# Patient Record
Sex: MALE | Race: BLACK OR AFRICAN AMERICAN | NOT HISPANIC OR LATINO | Employment: FULL TIME | URBAN - METROPOLITAN AREA
[De-identification: names, ages, dates, MRNs, and addresses within clinical notes are randomized per-mention and may not be internally consistent; named-entity substitution may affect disease eponyms.]

---

## 2022-02-23 ENCOUNTER — HISTORICAL (OUTPATIENT)
Dept: ADMINISTRATIVE | Facility: HOSPITAL | Age: 62
End: 2022-02-23

## 2022-02-23 LAB
ABS NEUT (OLG): 3.2 (ref 2.1–9.2)
BASOPHILS # BLD AUTO: 0 10*3/UL (ref 0–0.2)
BASOPHILS NFR BLD AUTO: 1 %
BUN SERPL-MCNC: 14.7 MG/DL (ref 8.4–25.7)
CALCIUM SERPL-MCNC: 9.4 MG/DL (ref 8.7–10.5)
CHLORIDE SERPL-SCNC: 107 MMOL/L (ref 98–107)
CO2 SERPL-SCNC: 28 MMOL/L (ref 23–31)
CREAT SERPL-MCNC: 0.8 MG/DL (ref 0.73–1.18)
CREAT/UREA NIT SERPL: 18
EOSINOPHIL # BLD AUTO: 0.1 10*3/UL (ref 0–0.9)
EOSINOPHIL NFR BLD AUTO: 2 %
ERYTHROCYTE [DISTWIDTH] IN BLOOD BY AUTOMATED COUNT: 13.3 % (ref 11.5–14.5)
FLAG2 (OHS): 70
FLAG3 (OHS): 90
FLAGS (OHS): 70
GLUCOSE SERPL-MCNC: 83 MG/DL (ref 82–115)
HCT VFR BLD AUTO: 44.6 % (ref 40–51)
HEMOLYSIS INTERF INDEX SERPL-ACNC: 6
HGB BLD-MCNC: 15.2 G/DL (ref 13.5–17.5)
ICTERIC INTERF INDEX SERPL-ACNC: 0
IMM GRANULOCYTES # BLD AUTO: 0.01 10*3/UL
IMM GRANULOCYTES NFR BLD AUTO: 0 %
IMM. NE 2 SUSPECT FLAG (OHS): 10
LIPEMIC INTERF INDEX SERPL-ACNC: 4
LOW EVENT # SUSPECT FLAG (OHS): 70
LYMPHOCYTES # BLD AUTO: 1.8 10*3/UL (ref 0.6–4.6)
LYMPHOCYTES NFR BLD AUTO: 31 %
MANUAL DIFF? (OHS): NO
MCH RBC QN AUTO: 32.1 PG (ref 26–34)
MCHC RBC AUTO-ENTMCNC: 34.1 G/DL (ref 31–37)
MCV RBC AUTO: 94.1 FL (ref 80–100)
MO BLASTS SUSPECT FLAG (OHS): 40
MONOCYTES # BLD AUTO: 0.7 10*3/UL (ref 0.1–1.3)
MONOCYTES NFR BLD AUTO: 12 %
NEUTROPHILS # BLD AUTO: 3.2 10*3/UL (ref 2.1–9.2)
NEUTROPHILS NFR BLD AUTO: 54 %
NRBC BLD AUTO-RTO: 0 % (ref 0–0.2)
PLATELET # BLD AUTO: 228 10*3/UL (ref 130–400)
PLATELET CLUMPS SUSPECT FLAG (OHS): 10
PMV BLD AUTO: 10 FL (ref 7.4–10.4)
POTASSIUM SERPL-SCNC: 3.8 MMOL/L (ref 3.5–5.1)
RBC # BLD AUTO: 4.74 10*6/UL (ref 4.5–5.9)
SARS-COV-2 AG RESP QL IA.RAPID: POSITIVE
SODIUM SERPL-SCNC: 138 MMOL/L (ref 136–145)
WBC # SPEC AUTO: 5.9 10*3/UL (ref 4.5–11)

## 2022-04-26 ENCOUNTER — HISTORICAL (OUTPATIENT)
Dept: ADMINISTRATIVE | Facility: HOSPITAL | Age: 62
End: 2022-04-26

## 2022-06-09 ENCOUNTER — OFFICE VISIT (OUTPATIENT)
Dept: SURGERY | Facility: CLINIC | Age: 62
End: 2022-06-09

## 2022-06-09 ENCOUNTER — ANESTHESIA EVENT (OUTPATIENT)
Dept: SURGERY | Facility: HOSPITAL | Age: 62
End: 2022-06-09

## 2022-06-09 VITALS
DIASTOLIC BLOOD PRESSURE: 75 MMHG | HEART RATE: 77 BPM | RESPIRATION RATE: 18 BRPM | WEIGHT: 180 LBS | TEMPERATURE: 98 F | SYSTOLIC BLOOD PRESSURE: 111 MMHG | BODY MASS INDEX: 25.2 KG/M2 | HEIGHT: 71 IN

## 2022-06-09 DIAGNOSIS — Z01.818 PRE-OP TESTING: ICD-10-CM

## 2022-06-09 DIAGNOSIS — M79.89 SOFT TISSUE MASS: Primary | ICD-10-CM

## 2022-06-09 PROCEDURE — 99214 OFFICE O/P EST MOD 30 MIN: CPT | Mod: PBBFAC

## 2022-06-09 RX ORDER — SODIUM CHLORIDE 9 MG/ML
INJECTION, SOLUTION INTRAVENOUS CONTINUOUS
Status: CANCELLED | OUTPATIENT
Start: 2022-06-09

## 2022-06-09 RX ORDER — HEPARIN SODIUM 5000 [USP'U]/ML
5000 INJECTION, SOLUTION INTRAVENOUS; SUBCUTANEOUS ONCE
Status: CANCELLED | OUTPATIENT
Start: 2022-06-09

## 2022-06-09 NOTE — PROGRESS NOTES
[facility] General Surgery Clinic Note    HPI:   62 yo M with history of right inguinal hernia s/p hernia repair on 3/25/22 presents to clinic with complaints of painful lump to right abdomen. He was last seen in clinic on 4/28/22 for this issue. Has had ultrasound of this area which was unrevealing of any pathology. Since last visit, pt denies any change in shape or size of lesion, erythema, or drainage. Pt reports he first noticed lump a couple months ago, and it has become more noticeable as he has gained weight in the past couple of months since he quit smoking. Has never had trauma to this area. Denies fever, chills, n/v, change in BMs or appetite.    PMH:   No significant PMH    PSH:   Right inguinal hernia repair: 3/25/22    FamHx:   Mother: dementia    SocHx:  Tobacco: former smoker, quit 2 months ago  EtOH: drinks a few beers on weekends  Drug use: none    Allergies:   NKDA    Medications:  None      Objective:  Vitals:    06/09/22 1132   BP: 111/75   Pulse: 77   Resp: 18   Temp: 97.9 °F (36.6 °C)       Physical Exam:  Gen: NAD  Neuro: alert, answering questions appropriately  Resp: non-labored breathing  Abd: soft, non-distended; 3x2cm soft, mobile mass to right abdomen with tenderness to palpation  Ext: moves all extremities symmetrically      A/P:   61 y.o. male with history of right inguinal hernia s/p hernia repair on 3/25/22 presents to clinic with complaints of painful lump to right abdomen. Previous U/S of this area unrevealing of any pathology.     - schedule for excision of lesion (lipoma vs cyst)      Kenya Crook, MS3    ------------------------------  ADDENDUM  Patient seen and examined with medical student. I have edited the above note and agree with the plan as follows:    Patient doing very well post op inguinal hernia repair. Incision well healing. Tolerating diet with no issues. Denies obstructive symptoms. Regular BMs. Patient's only complaint is a right abdominal mass that is mobile and  tender to palpation. Abdominal ultrasound was unremarkable. On exam, mass is most consistent with a lipoma. Risks vs benefits discussed with patient, who wishes to proceed with soft tissue excision. Patient understands that diagnosis is not certain at this time and recurrence is possible in the event mass is in fact a lipoma.    - Booked for surgery 6/15  - Consents obtained in clinic  - COVID ordered, as well as preop heparin  - NPO midnight prior to surgery    Kulwant Quintero MD  Surgery

## 2022-06-09 NOTE — PROGRESS NOTES
I have reviewed the note and assessment with the Resident. Patient seen and mass palpated.   I agree with the documentation and plan of care.

## 2022-06-09 NOTE — ANESTHESIA PREPROCEDURE EVALUATION
06/09/2022  Za Ridley Sr. is a 61 y.o., male.    COVID STATUS: VACCINE 1 of 2 (LAST DOSE 12/9/21); 2/23/22 COVID + (ASYMPTOMATIC; NO Tx); TEST DOS  BETA-BLOCKER: NONE    PAT NURSE PHONE INTERVIEW 6/10/22    PROBLEM LIST:  -  RIGHT ABDOMEN SOFT TISSUE MASS      - S/P 3/25/22 RIGHT IHR  -  PAST SMOKER - QUIT X 2 mos.; 20+PPY  -  ETOH 3-4 BEERS on SATURDAYS    AM Rx DOS: NONE    ORDERS -   SURGEON: 11/30/21 EKG; 2/23/22 CBC, BMP; NO NEW ORDERS  ANESTHESIA: NONE      Pre-op Assessment    I have reviewed the Patient Summary Reports.     I have reviewed the Nursing Notes. I have reviewed the NPO Status.   I have reviewed the Medications.     Review of Systems  Anesthesia Hx:  Denies Family Hx of Anesthesia complications.   Denies Personal Hx of Anesthesia complications.       Physical Exam  General: Well nourished    Airway:  Mallampati: II / II  Mouth Opening: Normal  TM Distance: Normal  Tongue: Normal  Neck ROM: Normal ROM    Dental:  Intact  Chip upper left central incisor  Chest/Lungs:  Clear to auscultation, Normal Respiratory Rate    Heart:  Rate: Normal  Rhythm: Regular Rhythm        Anesthesia Plan  Type of Anesthesia, risks & benefits discussed:    Anesthesia Type: Gen Supraglottic Airway  Intra-op Monitoring Plan: Standard ASA Monitors  Post Op Pain Control Plan: multimodal analgesia and IV/PO Opioids PRN  Induction:  IV  Informed Consent: Informed consent signed with the Patient and all parties understand the risks and agree with anesthesia plan.  All questions answered.   ASA Score: 2  Day of Surgery Review of History & Physical: H&P Update referred to the surgeon/provider.I have interviewed and examined the patient. I have reviewed the patient's H&P dated: There are no significant changes. H&P completed by Anesthesiologist.  Anesthesia Plan Notes: Use LMA #5    Ready For Surgery From Anesthesia  Perspective.     .

## 2022-06-09 NOTE — PROGRESS NOTES
Pt seen by Dr. Quintero for 6 week follow up for Inguinal hernia repair.  Scheduled for Excision of lipoma at abdomen for 6/15.  RTC 2 weeks post op  Consent signed.

## 2022-06-09 NOTE — H&P (VIEW-ONLY)
[facility] General Surgery Clinic Note    HPI:   60 yo M with history of right inguinal hernia s/p hernia repair on 3/25/22 presents to clinic with complaints of painful lump to right abdomen. He was last seen in clinic on 4/28/22 for this issue. Has had ultrasound of this area which was unrevealing of any pathology. Since last visit, pt denies any change in shape or size of lesion, erythema, or drainage. Pt reports he first noticed lump a couple months ago, and it has become more noticeable as he has gained weight in the past couple of months since he quit smoking. Has never had trauma to this area. Denies fever, chills, n/v, change in BMs or appetite.    PMH:   No significant PMH    PSH:   Right inguinal hernia repair: 3/25/22    FamHx:   Mother: dementia    SocHx:  Tobacco: former smoker, quit 2 months ago  EtOH: drinks a few beers on weekends  Drug use: none    Allergies:   NKDA    Medications:  None      Objective:  Vitals:    06/09/22 1132   BP: 111/75   Pulse: 77   Resp: 18   Temp: 97.9 °F (36.6 °C)       Physical Exam:  Gen: NAD  Neuro: alert, answering questions appropriately  Resp: non-labored breathing  Abd: soft, non-distended; 3x2cm soft, mobile mass to right abdomen with tenderness to palpation  Ext: moves all extremities symmetrically      A/P:   61 y.o. male with history of right inguinal hernia s/p hernia repair on 3/25/22 presents to clinic with complaints of painful lump to right abdomen. Previous U/S of this area unrevealing of any pathology.     - schedule for excision of lesion (lipoma vs cyst)      Kenya Crook, MS3    ------------------------------  ADDENDUM  Patient seen and examined with medical student. I have edited the above note and agree with the plan as follows:    Patient doing very well post op inguinal hernia repair. Incision well healing. Tolerating diet with no issues. Denies obstructive symptoms. Regular BMs. Patient's only complaint is a right abdominal mass that is mobile and  tender to palpation. Abdominal ultrasound was unremarkable. On exam, mass is most consistent with a lipoma. Risks vs benefits discussed with patient, who wishes to proceed with soft tissue excision. Patient understands that diagnosis is not certain at this time and recurrence is possible in the event mass is in fact a lipoma.    - Booked for surgery 6/15  - Consents obtained in clinic  - COVID ordered, as well as preop heparin  - NPO midnight prior to surgery    Kulwant Quintero MD  Surgery

## 2022-06-13 RX ORDER — OXYCODONE HYDROCHLORIDE 5 MG/1
5 TABLET ORAL
Status: CANCELLED | OUTPATIENT
Start: 2022-06-13

## 2022-06-13 RX ORDER — HYDROMORPHONE HYDROCHLORIDE 1 MG/ML
0.2 INJECTION, SOLUTION INTRAMUSCULAR; INTRAVENOUS; SUBCUTANEOUS EVERY 5 MIN PRN
Status: CANCELLED | OUTPATIENT
Start: 2022-06-13

## 2022-06-13 RX ORDER — DROPERIDOL 2.5 MG/ML
0.25 INJECTION, SOLUTION INTRAMUSCULAR; INTRAVENOUS ONCE AS NEEDED
Status: CANCELLED | OUTPATIENT
Start: 2022-06-13 | End: 2033-11-09

## 2022-06-13 RX ORDER — DIPHENHYDRAMINE HYDROCHLORIDE 50 MG/ML
6.25 INJECTION INTRAMUSCULAR; INTRAVENOUS ONCE AS NEEDED
Status: CANCELLED | OUTPATIENT
Start: 2022-06-13 | End: 2033-11-09

## 2022-06-13 RX ORDER — METOCLOPRAMIDE HYDROCHLORIDE 5 MG/ML
10 INJECTION INTRAMUSCULAR; INTRAVENOUS ONCE AS NEEDED
Status: CANCELLED | OUTPATIENT
Start: 2022-06-13 | End: 2033-11-09

## 2022-06-15 ENCOUNTER — HOSPITAL ENCOUNTER (OUTPATIENT)
Facility: HOSPITAL | Age: 62
Discharge: HOME | End: 2022-06-15
Attending: SURGERY | Admitting: SURGERY

## 2022-06-15 ENCOUNTER — ANESTHESIA (OUTPATIENT)
Dept: SURGERY | Facility: HOSPITAL | Age: 62
End: 2022-06-15

## 2022-06-15 DIAGNOSIS — M79.89 SOFT TISSUE MASS: ICD-10-CM

## 2022-06-15 LAB
CTP QC/QA: YES
SARS-COV-2 AG RESP QL IA.RAPID: NEGATIVE

## 2022-06-15 PROCEDURE — 71000015 HC POSTOP RECOV 1ST HR: Performed by: SURGERY

## 2022-06-15 PROCEDURE — 71000033 HC RECOVERY, INTIAL HOUR: Performed by: SURGERY

## 2022-06-15 PROCEDURE — 63600175 PHARM REV CODE 636 W HCPCS: Performed by: ANESTHESIOLOGY

## 2022-06-15 PROCEDURE — 37000008 HC ANESTHESIA 1ST 15 MINUTES: Performed by: SURGERY

## 2022-06-15 PROCEDURE — 36000706: Performed by: SURGERY

## 2022-06-15 PROCEDURE — 25000003 PHARM REV CODE 250: Performed by: NURSE ANESTHETIST, CERTIFIED REGISTERED

## 2022-06-15 PROCEDURE — 63600175 PHARM REV CODE 636 W HCPCS: Performed by: STUDENT IN AN ORGANIZED HEALTH CARE EDUCATION/TRAINING PROGRAM

## 2022-06-15 PROCEDURE — 36000707: Performed by: SURGERY

## 2022-06-15 PROCEDURE — 71000016 HC POSTOP RECOV ADDL HR: Performed by: SURGERY

## 2022-06-15 PROCEDURE — 63600175 PHARM REV CODE 636 W HCPCS: Performed by: NURSE ANESTHETIST, CERTIFIED REGISTERED

## 2022-06-15 PROCEDURE — 37000009 HC ANESTHESIA EA ADD 15 MINS: Performed by: SURGERY

## 2022-06-15 RX ORDER — SODIUM CHLORIDE, SODIUM LACTATE, POTASSIUM CHLORIDE, CALCIUM CHLORIDE 600; 310; 30; 20 MG/100ML; MG/100ML; MG/100ML; MG/100ML
INJECTION, SOLUTION INTRAVENOUS CONTINUOUS
Status: ACTIVE | OUTPATIENT
Start: 2022-06-15

## 2022-06-15 RX ORDER — PROPOFOL 10 MG/ML
INJECTION, EMULSION INTRAVENOUS
Status: DISCONTINUED | OUTPATIENT
Start: 2022-06-15 | End: 2022-06-15

## 2022-06-15 RX ORDER — LIDOCAINE HYDROCHLORIDE 10 MG/ML
1 INJECTION, SOLUTION EPIDURAL; INFILTRATION; INTRACAUDAL; PERINEURAL ONCE
Status: ACTIVE | OUTPATIENT
Start: 2022-06-15

## 2022-06-15 RX ORDER — HEPARIN SODIUM 5000 [USP'U]/ML
5000 INJECTION, SOLUTION INTRAVENOUS; SUBCUTANEOUS ONCE
Status: COMPLETED | OUTPATIENT
Start: 2022-06-15 | End: 2022-06-15

## 2022-06-15 RX ORDER — CEFAZOLIN SODIUM 1 G/3ML
INJECTION, POWDER, FOR SOLUTION INTRAMUSCULAR; INTRAVENOUS
Status: DISCONTINUED | OUTPATIENT
Start: 2022-06-15 | End: 2022-06-15

## 2022-06-15 RX ORDER — HEPARIN SODIUM 5000 [USP'U]/ML
INJECTION, SOLUTION INTRAVENOUS; SUBCUTANEOUS
Status: DISCONTINUED
Start: 2022-06-15 | End: 2022-06-15 | Stop reason: HOSPADM

## 2022-06-15 RX ORDER — BUPIVACAINE HYDROCHLORIDE AND EPINEPHRINE 5; 5 MG/ML; UG/ML
INJECTION, SOLUTION EPIDURAL; INTRACAUDAL; PERINEURAL
Status: DISCONTINUED
Start: 2022-06-15 | End: 2022-06-15 | Stop reason: WASHOUT

## 2022-06-15 RX ORDER — LIDOCAINE HCL/PF 100 MG/5ML
SYRINGE (ML) INTRAVENOUS
Status: DISCONTINUED | OUTPATIENT
Start: 2022-06-15 | End: 2022-06-15

## 2022-06-15 RX ORDER — KETOROLAC TROMETHAMINE 30 MG/ML
INJECTION, SOLUTION INTRAMUSCULAR; INTRAVENOUS
Status: DISCONTINUED | OUTPATIENT
Start: 2022-06-15 | End: 2022-06-15

## 2022-06-15 RX ORDER — DEXAMETHASONE SODIUM PHOSPHATE 4 MG/ML
INJECTION, SOLUTION INTRA-ARTICULAR; INTRALESIONAL; INTRAMUSCULAR; INTRAVENOUS; SOFT TISSUE
Status: DISCONTINUED | OUTPATIENT
Start: 2022-06-15 | End: 2022-06-15

## 2022-06-15 RX ORDER — MIDAZOLAM HYDROCHLORIDE 1 MG/ML
1 INJECTION INTRAMUSCULAR; INTRAVENOUS ONCE AS NEEDED
Status: COMPLETED | OUTPATIENT
Start: 2022-06-15 | End: 2022-06-15

## 2022-06-15 RX ORDER — FENTANYL CITRATE 50 UG/ML
INJECTION, SOLUTION INTRAMUSCULAR; INTRAVENOUS
Status: DISCONTINUED | OUTPATIENT
Start: 2022-06-15 | End: 2022-06-15

## 2022-06-15 RX ORDER — EPHEDRINE SULFATE 50 MG/ML
INJECTION, SOLUTION INTRAVENOUS
Status: DISCONTINUED | OUTPATIENT
Start: 2022-06-15 | End: 2022-06-15

## 2022-06-15 RX ORDER — ONDANSETRON 2 MG/ML
INJECTION INTRAMUSCULAR; INTRAVENOUS
Status: DISCONTINUED | OUTPATIENT
Start: 2022-06-15 | End: 2022-06-15

## 2022-06-15 RX ORDER — SODIUM CHLORIDE 9 MG/ML
INJECTION, SOLUTION INTRAVENOUS CONTINUOUS
Status: DISCONTINUED | OUTPATIENT
Start: 2022-06-15 | End: 2022-06-15 | Stop reason: HOSPADM

## 2022-06-15 RX ADMIN — MIDAZOLAM HYDROCHLORIDE 1 MG: 1 INJECTION, SOLUTION INTRAMUSCULAR; INTRAVENOUS at 07:06

## 2022-06-15 RX ADMIN — ONDANSETRON 4 MG: 2 INJECTION INTRAMUSCULAR; INTRAVENOUS at 08:06

## 2022-06-15 RX ADMIN — FENTANYL CITRATE 100 MCG: 50 INJECTION, SOLUTION INTRAMUSCULAR; INTRAVENOUS at 08:06

## 2022-06-15 RX ADMIN — LIDOCAINE HYDROCHLORIDE 50 MG: 20 INJECTION, SOLUTION INTRAVENOUS at 08:06

## 2022-06-15 RX ADMIN — KETOROLAC TROMETHAMINE 30 MG: 30 INJECTION, SOLUTION INTRAMUSCULAR; INTRAVENOUS at 08:06

## 2022-06-15 RX ADMIN — SODIUM CHLORIDE, POTASSIUM CHLORIDE, SODIUM LACTATE AND CALCIUM CHLORIDE: 600; 310; 30; 20 INJECTION, SOLUTION INTRAVENOUS at 07:06

## 2022-06-15 RX ADMIN — EPHEDRINE SULFATE 10 MG: 50 INJECTION INTRAVENOUS at 08:06

## 2022-06-15 RX ADMIN — CEFAZOLIN 2 G: 330 INJECTION, POWDER, FOR SOLUTION INTRAMUSCULAR; INTRAVENOUS at 08:06

## 2022-06-15 RX ADMIN — DEXAMETHASONE SODIUM PHOSPHATE 8 MG: 4 INJECTION, SOLUTION INTRA-ARTICULAR; INTRALESIONAL; INTRAMUSCULAR; INTRAVENOUS; SOFT TISSUE at 08:06

## 2022-06-15 RX ADMIN — PROPOFOL 200 MG: 10 INJECTION, EMULSION INTRAVENOUS at 08:06

## 2022-06-15 RX ADMIN — EPHEDRINE SULFATE 10 MG: 50 INJECTION INTRAVENOUS at 09:06

## 2022-06-15 RX ADMIN — HEPARIN SODIUM 5000 UNITS: 5000 INJECTION, SOLUTION INTRAVENOUS; SUBCUTANEOUS at 06:06

## 2022-06-15 NOTE — TRANSFER OF CARE
Anesthesia Transfer of Care Note    Patient: Za Ridley Sr.    Procedure(s) Performed: Procedure(s) (LRB):  EXCISION, MASS, ABDOMEN (Right)    Patient location: GI    Anesthesia Type: general    Transport from OR: Transported from OR on room air with adequate spontaneous ventilation    Post pain: adequate analgesia    Post assessment: no apparent anesthetic complications    Post vital signs: stable    Level of consciousness: awake and sedated    Nausea/Vomiting: no nausea/vomiting    Complications: none    Transfer of care protocol was followed      Last vitals:   Visit Vitals  /73 (BP Location: Left arm, Patient Position: Lying)   Pulse 71   Temp 36.4 °C (97.5 °F) (Tympanic)   Resp 18   SpO2 100%

## 2022-06-15 NOTE — INTERVAL H&P NOTE
The patient has been examined and the H&P has been reviewed:    I concur with the findings and no changes have occurred since H&P was written.    Surgery risks, benefits and alternative options discussed and understood by patient/family.    Patient understands risks vs benefits of right abdominal soft tissue mass excision and wishes to proceed.      There are no hospital problems to display for this patient.

## 2022-06-15 NOTE — ANESTHESIA POSTPROCEDURE EVALUATION
Anesthesia Post Evaluation    Patient: Za Ridley SrMynor    Procedure(s) Performed: Procedure(s) (LRB):  EXCISION, MASS, ABDOMEN (Right)    Final Anesthesia Type: general      Patient location during evaluation: PACU  Patient participation: Yes- Able to Participate  Level of consciousness: awake and alert  Post-procedure vital signs: reviewed and stable  Pain management: adequate  Airway patency: patent    PONV status at discharge: No PONV  Anesthetic complications: no      Cardiovascular status: hemodynamically stable  Respiratory status: unassisted  Hydration status: euvolemic  Follow-up not needed.          Vitals Value Taken Time   /79 06/15/22 0950   Temp 36.3 °C (97.3 °F) 06/15/22 0935   Pulse 58 06/15/22 0950   Resp 18 06/15/22 0950   SpO2 99 % 06/15/22 0950         Event Time   Out of Recovery 09:35:00         Pain/Trae Score: Tare Score: 10 (6/15/2022  9:46 AM)

## 2022-06-15 NOTE — DISCHARGE SUMMARY
[facility]  Discharge Note  Short Stay    Procedure(s) (LRB):  EXCISION, MASS, ABDOMEN (Right)    OUTCOME: Patient tolerated treatment/procedure well without complication and is now ready for discharge.    DISPOSITION: Home or Self Care    FINAL DIAGNOSIS:  Soft tissue mass    FOLLOWUP: In clinic    DISCHARGE INSTRUCTIONS:    Discharge Procedure Orders   Diet Adult Regular     No dressing needed     Notify your health care provider if you experience any of the following:  temperature >100.4     Notify your health care provider if you experience any of the following:  severe uncontrolled pain     Notify your health care provider if you experience any of the following:  redness, tenderness, or signs of infection (pain, swelling, redness, odor or green/yellow discharge around incision site)     No dressing needed     Activity as tolerated     Activity as tolerated        TIME SPENT ON DISCHARGE: 20 minutes

## 2022-06-15 NOTE — ANESTHESIA PROCEDURE NOTES
Intubation    Date/Time: 6/15/2022 8:43 AM  Performed by: Ravin Calero CRNA  Authorized by: Delfina Schultz MD     Intubation:     Induction:  Intravenous    Intubated:  Postinduction    Mask Ventilation:  Easy with oral airway    Attempts:  1    Attempted By:  CRNA    Method of Intubation:  Direct    Laryngeal View Grade: Laryngeal Manipulation      Laryngeal View Grade (2nd Attempt): Laryngeal Manipulation      Laryngeal View Grade (3rd Attempt): Laryngeal Manipulation      Difficult Airway Encountered?: No      Complications:  None    Airway Device:  Supraglottic airway/LMA    Airway Device Size:  5.0    Tube type: igel.    Placement Verified By:  Capnometry    Complicating Factors:  None    Findings Post-Intubation:  BS equal bilateral and atraumatic/condition of teeth unchanged

## 2022-06-15 NOTE — OP NOTE
[facility]  General Surgery  Operative Note    SUMMARY     Date of Procedure: 06/15/2022     Procedure: Abdominal wall mass excision    Surgeon(s) and Role:  Staff: Doug King MD  Resident(s): Alexis Scheuermann, MD    Pre-Operative Diagnosis: Abdominal wall mass    Post-Operative Diagnosis: Normal abdominal wall tissue    Anesthesia: GETA    Operative Findings: Normal abdominal wall tissue; no appreciable mass    Description of Technical Procedures:   The patient was identified in the pre-op holding area by name, , and MRN. An abdominal wall mass was palpable in the RUQ, and the site was marked. After verifying that written informed consent had been obtained, the patient was taken to the OR and placed on the table in the supine position. GETA was administered by anesthesia w/o complications. The abdomen was prepped and draped in the usual sterile fashion. A standard time-out was performed, again identifying the correct patient and procedure to be performed.     The previously palpable mass in the RUQ was no longer palpable intraoperatively. A #15 blade was used to make an approx 2 cm incision over the marked area. Bovie electrocautery was used to deepen the incision, and blunt dissection was used to explore the subcutaneous space down to the abdominal wall fascia. No discreet mass was identified. Some of the subcutaneous fat was excised w/ Bovie electrocautery and passed off of the field to be sent as a specimen to pathology. The incision was closed w/ 3-0 Vicryl uin the subcutaneous tissue and 4-0 Monocryl in the skin. Dermabond was applied.     The patient was awakened from anesthesia, extubated, and brought to the PACU in a stable condition having suffered no untoward events.     All suture, needle, and instrument counts were correct x2 at the conclusion of the case.     Dr. King was present for all critical portions of the case.     Estimated Blood Loss (EBL): 3 cc            Implants: None    Drains: None    Specimens: Abdominal wall fat    Complications: None            Condition: Stable    Disposition: None    Alexis Scheuermann, MD   [employer] General Surgery, PGY3  06/15/2022 9:21 AM

## 2022-06-15 NOTE — DISCHARGE INSTRUCTIONS
No bath, no submerging in water, I.e., swimming until MD appointment in 2 weeks. Showers only. May return to work tomorrow

## 2022-06-16 LAB
ESTROGEN SERPL-MCNC: NORMAL PG/ML
INSULIN SERPL-ACNC: NORMAL U[IU]/ML
LAB AP CLINICAL INFORMATION: NORMAL
LAB AP GROSS DESCRIPTION: NORMAL
LAB AP REPORT FOOTNOTES: NORMAL
T3RU NFR SERPL: NORMAL %

## 2022-06-20 VITALS
OXYGEN SATURATION: 99 % | HEART RATE: 58 BPM | DIASTOLIC BLOOD PRESSURE: 79 MMHG | TEMPERATURE: 97 F | RESPIRATION RATE: 18 BRPM | SYSTOLIC BLOOD PRESSURE: 112 MMHG

## 2022-06-28 ENCOUNTER — OFFICE VISIT (OUTPATIENT)
Dept: SURGERY | Facility: CLINIC | Age: 62
End: 2022-06-28

## 2022-06-28 VITALS
TEMPERATURE: 98 F | BODY MASS INDEX: 25.85 KG/M2 | DIASTOLIC BLOOD PRESSURE: 76 MMHG | RESPIRATION RATE: 18 BRPM | OXYGEN SATURATION: 97 % | HEIGHT: 71 IN | HEART RATE: 76 BPM | SYSTOLIC BLOOD PRESSURE: 115 MMHG | WEIGHT: 184.63 LBS

## 2022-06-28 DIAGNOSIS — R19.00 ABDOMINAL MASS, UNSPECIFIED ABDOMINAL LOCATION: Primary | ICD-10-CM

## 2022-06-28 PROCEDURE — 99213 OFFICE O/P EST LOW 20 MIN: CPT | Mod: PBBFAC

## 2022-06-28 NOTE — LETTER
June 28, 2022      [facility]  [address] [facility] [address]  Phone: [phone]       Patient: Za Ridley   YOB: 1960  Date of Visit: 06/28/2022    To Whom It May Concern:    Emery Ridley  was at [facility] on 06/28/2022. The patient may return to work on 06/29/2022 with no restrictions. If you have any questions or concerns, or if I can be of further assistance, please do not hesitate to contact me.    Sincerely,    Melissa Onofre RN

## 2022-06-28 NOTE — PROGRESS NOTES
[facility] General Surgery Clinic  Established Patient Follow-Up    62 yo M previously seen in clinic for evaluation of a painful RUQ abd wall mass. Scheduled for excision 6/15/22. Intraoperatively, however, no mass was identified. Abd wall was explored down to the level of the fascia. Normal appearing abd wall fat in the area of previously described mass was excised and sent to pathology. Patient presents today for routine post-op f/u. No complaints. Pain resolved. Incision well healing.     BP: 115/76   Pulse: 76   Resp: 18   Temp: 98.2 °F (36.8 °C)      NAD  RRR  Non-labored breathing, WINIFRED  Abd incision C/D/I w/ no erythema or drainage  Palpable scar tissue underlying incision    Surgical Path:  Fragments of mature adipose tissue, consistent with lipoma    62 yo M w/ h/o RUQ abd wall mass, possible lipoma. S/p excision 6/15/22. Doing well from postop perspective. No issues.  RTC PRN.     Alexis Scheuermann, MD   [facility] General Surgery, PGY3  06/28/2022 10:47 AM

## 2022-06-28 NOTE — PROGRESS NOTES
Pt seen by Dr. Scheuermann; Pt instructed to return to clinic as needed; Discharge paperwork given w/pt verbalizing understanding

## (undated) DEVICE — HANDLE DEVON RIGID OR LIGHT

## (undated) DEVICE — GLOVE PROTEXIS LTX MICRO  7

## (undated) DEVICE — APPLICATOR CHLORAPREP ORN 26ML

## (undated) DEVICE — GLOVE PROTEXIS LTX MICRO 8

## (undated) DEVICE — SUT PDSII 4-0 PS-2 CLEAR MO

## (undated) DEVICE — MANIFOLD 4 PORT

## (undated) DEVICE — NDL HYPO REG 25G X 1 1/2

## (undated) DEVICE — SOL NACL IRR 1000ML BTL

## (undated) DEVICE — GOWN X-LG STERILE BACK

## (undated) DEVICE — SUT 3-0 VICRYL / SH (J416)

## (undated) DEVICE — SYR 10CC LUER LOCK

## (undated) DEVICE — GLOVE PROTEXIS PI SYN SURG 7.5

## (undated) DEVICE — GLOVE PROTEXIS LTX MICRO 6

## (undated) DEVICE — GLOVE PROTEXIS BLUE LATEX 7.5

## (undated) DEVICE — CLIPPER BLADE MOD 4406 (CAREF)

## (undated) DEVICE — Device

## (undated) DEVICE — ADHESIVE DERMABOND ADVANCED